# Patient Record
Sex: MALE | Race: WHITE | NOT HISPANIC OR LATINO | Employment: OTHER | URBAN - METROPOLITAN AREA
[De-identification: names, ages, dates, MRNs, and addresses within clinical notes are randomized per-mention and may not be internally consistent; named-entity substitution may affect disease eponyms.]

---

## 2023-12-08 ENCOUNTER — HOSPITAL ENCOUNTER (OUTPATIENT)
Dept: RADIOLOGY | Facility: MEDICAL CENTER | Age: 78
End: 2023-12-08
Attending: INTERNAL MEDICINE
Payer: COMMERCIAL

## 2023-12-08 VITALS — OXYGEN SATURATION: 90 % | HEART RATE: 90 BPM

## 2023-12-08 DIAGNOSIS — M25.511 RIGHT SHOULDER PAIN, UNSPECIFIED CHRONICITY: ICD-10-CM

## 2023-12-08 PROCEDURE — 73221 MRI JOINT UPR EXTREM W/O DYE: CPT | Mod: RT

## 2023-12-08 NOTE — PROGRESS NOTES
MRI NURSING NOTE:      Rebecca Peterson rep connected via [employer] for CRT-P device setting changes for MRI of R shoulder today. Pt educated when to alert MRI tech/Imaging RN. Pt shanti.    Pt had a lot of breathing motion and R shoulder pain (not to cardiac device) on 1.5T MR scanner hence, he was moved to the 3T MR scanner after confirming c Rebecca Peterson, ok to scan in 3T MR. Rebecca CRT-P set to DOO 90 per Rebecca Peterson.  HR, EKG, BP, pulse ox monitored during scan. MR R shoulder completed and Pt tolerated much better s discomfort.  Pre-mri settings will automatically restore @ 1800 today per Rebecca Peterson.    Pt updated. VU.      No printed report received from Rebecca .